# Patient Record
Sex: MALE | Race: WHITE
[De-identification: names, ages, dates, MRNs, and addresses within clinical notes are randomized per-mention and may not be internally consistent; named-entity substitution may affect disease eponyms.]

---

## 2018-02-07 ENCOUNTER — HOSPITAL ENCOUNTER (EMERGENCY)
Dept: HOSPITAL 17 - NEPK | Age: 38
Discharge: HOME | End: 2018-02-07
Payer: SELF-PAY

## 2018-02-07 VITALS
DIASTOLIC BLOOD PRESSURE: 81 MMHG | SYSTOLIC BLOOD PRESSURE: 131 MMHG | RESPIRATION RATE: 17 BRPM | OXYGEN SATURATION: 95 % | HEART RATE: 72 BPM

## 2018-02-07 VITALS
SYSTOLIC BLOOD PRESSURE: 150 MMHG | OXYGEN SATURATION: 95 % | DIASTOLIC BLOOD PRESSURE: 90 MMHG | TEMPERATURE: 100 F | HEART RATE: 95 BPM | RESPIRATION RATE: 20 BRPM

## 2018-02-07 VITALS — WEIGHT: 176.37 LBS | HEIGHT: 71 IN | BODY MASS INDEX: 24.69 KG/M2

## 2018-02-07 VITALS
OXYGEN SATURATION: 95 % | SYSTOLIC BLOOD PRESSURE: 126 MMHG | DIASTOLIC BLOOD PRESSURE: 74 MMHG | RESPIRATION RATE: 18 BRPM | HEART RATE: 78 BPM

## 2018-02-07 DIAGNOSIS — R51: ICD-10-CM

## 2018-02-07 DIAGNOSIS — J10.1: Primary | ICD-10-CM

## 2018-02-07 DIAGNOSIS — F17.210: ICD-10-CM

## 2018-02-07 LAB
BASOPHILS # BLD AUTO: 0 TH/MM3 (ref 0–0.2)
BASOPHILS NFR BLD: 0.8 % (ref 0–2)
BUN SERPL-MCNC: 11 MG/DL (ref 7–18)
CALCIUM SERPL-MCNC: 8 MG/DL (ref 8.5–10.1)
CHLORIDE SERPL-SCNC: 104 MEQ/L (ref 98–107)
CREAT SERPL-MCNC: 0.81 MG/DL (ref 0.6–1.3)
EOSINOPHIL # BLD: 0 TH/MM3 (ref 0–0.4)
EOSINOPHIL NFR BLD: 0.2 % (ref 0–4)
ERYTHROCYTE [DISTWIDTH] IN BLOOD BY AUTOMATED COUNT: 13.2 % (ref 11.6–17.2)
GFR SERPLBLD BASED ON 1.73 SQ M-ARVRAT: 107 ML/MIN (ref 89–?)
GLUCOSE SERPL-MCNC: 95 MG/DL (ref 74–106)
HCO3 BLD-SCNC: 27.2 MEQ/L (ref 21–32)
HCT VFR BLD CALC: 41.3 % (ref 39–51)
HGB BLD-MCNC: 14.4 GM/DL (ref 13–17)
INR PPP: 1.1 RATIO
LYMPHOCYTES # BLD AUTO: 1 TH/MM3 (ref 1–4.8)
LYMPHOCYTES NFR BLD AUTO: 17.9 % (ref 9–44)
MCH RBC QN AUTO: 31.9 PG (ref 27–34)
MCHC RBC AUTO-ENTMCNC: 35 % (ref 32–36)
MCV RBC AUTO: 91.1 FL (ref 80–100)
MONOCYTE #: 0.7 TH/MM3 (ref 0–0.9)
MONOCYTES NFR BLD: 12.9 % (ref 0–8)
NEUTROPHILS # BLD AUTO: 3.8 TH/MM3 (ref 1.8–7.7)
NEUTROPHILS NFR BLD AUTO: 68.2 % (ref 16–70)
PLATELET # BLD: 243 TH/MM3 (ref 150–450)
PMV BLD AUTO: 8.7 FL (ref 7–11)
PROTHROMBIN TIME: 10.7 SEC (ref 9.8–11.6)
RBC # BLD AUTO: 4.53 MIL/MM3 (ref 4.5–5.9)
SODIUM SERPL-SCNC: 138 MEQ/L (ref 136–145)
WBC # BLD AUTO: 5.5 TH/MM3 (ref 4–11)

## 2018-02-07 PROCEDURE — 85730 THROMBOPLASTIN TIME PARTIAL: CPT

## 2018-02-07 PROCEDURE — 70450 CT HEAD/BRAIN W/O DYE: CPT

## 2018-02-07 PROCEDURE — A9579 GAD-BASE MR CONTRAST NOS,1ML: HCPCS

## 2018-02-07 PROCEDURE — 85610 PROTHROMBIN TIME: CPT

## 2018-02-07 PROCEDURE — 99285 EMERGENCY DEPT VISIT HI MDM: CPT

## 2018-02-07 PROCEDURE — 71046 X-RAY EXAM CHEST 2 VIEWS: CPT

## 2018-02-07 PROCEDURE — 70553 MRI BRAIN STEM W/O & W/DYE: CPT

## 2018-02-07 PROCEDURE — 85025 COMPLETE CBC W/AUTO DIFF WBC: CPT

## 2018-02-07 PROCEDURE — 87804 INFLUENZA ASSAY W/OPTIC: CPT

## 2018-02-07 PROCEDURE — 80048 BASIC METABOLIC PNL TOTAL CA: CPT

## 2018-02-07 NOTE — RADRPT
EXAM DATE/TIME:  02/07/2018 12:09 

 

[facility] COMPARISON:     

No previous studies available for comparison.

 

 

INDICATIONS :     

Cephalgia, left jaw pain, fever.

                      

 

RADIATION DOSE:     

56.35 CTDIvol (mGy) 

 

 

 

MEDICAL HISTORY :     

None  

 

SURGICAL HISTORY :      

Tonsillectomy. 

 

ENCOUNTER:      

Initial

 

ACUITY:      

2 days

 

PAIN SCALE:      

7/10

 

LOCATION:        

cranial 

 

TECHNIQUE:     

Multiple contiguous axial images were obtained of the head.  Using automated exposure control and adj
ustment of the mA and/or kV according to patient size, radiation dose was kept as low as reasonably a
chievable to obtain optimal diagnostic quality images.   DICOM format image data is available electro
nically for review and comparison.  

 

FINDINGS:     

There is a high density mass-like lesion within the left frontal lobe measuring 1.2 cm in size raisin
g the possibility of acute parenchymal contusion or possible hemorrhagic mass. MRI of the brain with 
and without contrast would be helpful for further characterization of this finding. No midline shift 
is noted. No acute infarct is noted. The ventricles are normal in size shape and position for the pat
ient's age.

 

CONCLUSION:     High density mass-like lesion within left frontal lobe measuring 1.2 cm in size raisi
ng possibility of acute parenchymal contusion versus hemorrhagic mass. MRI of the brain with and with
out contrast would be helpful for further characterization of this finding.     

 

 

 Natan Iyer MD on February 07, 2018 at 12:18           

Board Certified Radiologist.

 This report was verified electronically.

## 2018-02-07 NOTE — PD
Data


Data


Last Documented VS





Vital Signs








  Date Time  Temp Pulse Resp B/P (MAP) Pulse Ox O2 Delivery O2 Flow Rate FiO2


 


2/7/18 16:51        


 


2/7/18 16:31  72 17  95 Room Air  


 


2/7/18 10:56 100.0       








Orders





 Orders


Influenzae A/B Antigen (2/7/18 11:02)


Ct Brain W/O Iv Contrast(Rout) (2/7/18 )


Acetamin-Hydrocod 325-5 Mg (Norco  5-325 (2/7/18 11:15)


Ibuprofen (Motrin) (2/7/18 11:15)


Chest, Pa & Lat (2/7/18 )


Complete Blood Count With Diff (2/7/18 12:25)


Basic Metabolic Panel (Bmp) (2/7/18 12:25)


Prothrombin Time / Inr (Pt) (2/7/18 12:25)


Act Partial Throm Time (Ptt) (2/7/18 12:25)


Iv Access Insert/Monitor (2/7/18 12:25)


Mri Brain W&W/O Contrast (2/7/18 )


Gadodiamide Pf Inj (Omniscan Pf Inj) (2/7/18 10:56)


Ed Discharge Order (2/7/18 16:31)





Labs





Laboratory Tests








Test


  2/7/18


12:34


 


White Blood Count 5.5 TH/MM3 


 


Red Blood Count 4.53 MIL/MM3 


 


Hemoglobin 14.4 GM/DL 


 


Hematocrit 41.3 % 


 


Mean Corpuscular Volume 91.1 FL 


 


Mean Corpuscular Hemoglobin 31.9 PG 


 


Mean Corpuscular Hemoglobin


Concent 35.0 % 


 


 


Red Cell Distribution Width 13.2 % 


 


Platelet Count 243 TH/MM3 


 


Mean Platelet Volume 8.7 FL 


 


Neutrophils (%) (Auto) 68.2 % 


 


Lymphocytes (%) (Auto) 17.9 % 


 


Monocytes (%) (Auto) 12.9 % 


 


Eosinophils (%) (Auto) 0.2 % 


 


Basophils (%) (Auto) 0.8 % 


 


Neutrophils # (Auto) 3.8 TH/MM3 


 


Lymphocytes # (Auto) 1.0 TH/MM3 


 


Monocytes # (Auto) 0.7 TH/MM3 


 


Eosinophils # (Auto) 0.0 TH/MM3 


 


Basophils # (Auto) 0.0 TH/MM3 


 


CBC Comment DIFF FINAL 


 


Differential Comment  


 


Prothrombin Time 10.7 SEC 


 


Prothromb Time International


Ratio 1.1 RATIO 


 


 


Activated Partial


Thromboplast Time 26.7 SEC 


 


 


Blood Urea Nitrogen 11 MG/DL 


 


Creatinine 0.81 MG/DL 


 


Random Glucose 95 MG/DL 


 


Calcium Level 8.0 MG/DL 


 


Sodium Level 138 MEQ/L 


 


Potassium Level 3.3 MEQ/L 


 


Chloride Level 104 MEQ/L 


 


Carbon Dioxide Level 27.2 MEQ/L 


 


Anion Gap 7 MEQ/L 


 


Estimat Glomerular Filtration


Rate 107 ML/MIN 


 











MDM


Supervised Visit with [facility]:  Yes


Narrative Course


Patient CARE assume from Juanito Powell PA-C at 1315.  This is a patient who 

presents to emergency department for evaluation of flulike illness, he did 

endorse headache and a CT scan was ordered by Mr. Powell, it revealed either a 

mass or some blood in the left parietal anterior lobe.  Patient was recommended 

for an MRI by radiology and he is roomed in a medical pot awaiting his MRI, he 

appears well states his headache is only mild and tells me that he has a 

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 

years and he was told it was benign.  MRI results confirm similar findings to 

what he describes:


Last 24 hours Impressions








Head CT 2/7/18 0000 Signed





Impressions: 





 Service Date/Time:  Wednesday, February 7, 2018 12:09 - CONCLUSION: High 

density 





 mass-like lesion within left frontal lobe measuring 1.2 cm in size raising 





 possibility of acute parenchymal contusion versus hemorrhagic mass. MRI of the 





 brain with and without contrast would be helpful for further characterization 

of 





 this finding.         Natan Iyer MD 


 


Chest X-Ray 2/7/18 0000 Signed





Impressions: 





 Service Date/Time:  Wednesday, February 7, 2018 11:37 - CONCLUSION:  





 Non-consolidative left lower lobe infiltrates.     Rudy Leger MD 


 


Brain MRI 2/7/18 0000 Signed





Impressions: 





 Service Date/Time:  Wednesday, February 7, 2018 14:55 - CONCLUSION:  1. 





 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 

the 





 CT scan findings. No acute intracranial process is identified.     Tim Morrison MD 





I discussed with him these findings and he is reassured but also suggested he 

needs to follow-up with his primary care physician, small possible pneumonia 

the patient was placed on azithromycin as well as Tamiflu.  He is stable for 

discharge


Diagnosis





 Primary Impression:  


 Influenza A


 Additional Impression:  


 Headache


***Med/Other Pt SpecificInfo:  Prescription(s) given


Scripts


Azithromycin (Azithromycin) 250 Mg Tab


250 MG PO AS DIRECTED for Infection, #6 TAB 0 Refills


   Take 2 tabs (500 mg) on day 1 then 1 tab daily x 4 days.


   Prov: Natan Ng MD         2/7/18 


Oseltamivir (Tamiflu) 75 Mg Cap


75 MG PO BID for Mgmt Viral Infection for 5 Days, #10 CAP 0 Refills


   Prov: Natan Ng MD         2/7/18


Disposition:  01 DISCHARGE HOME


Condition:  Stable











Natan Ng MD Feb 7, 2018 13:31

## 2018-02-07 NOTE — RADRPT
EXAM DATE/TIME:  02/07/2018 11:37 

 

[facility] COMPARISON:     

No previous studies available for comparison.

 

                     

INDICATIONS :     

Cough. Fever.

                     

 

MEDICAL HISTORY :     

None.          

 

SURGICAL HISTORY :     

None.   

 

ENCOUNTER:     

Initial                                        

 

ACUITY:     

2 days      

 

PAIN SCORE:     

0/10

 

LOCATION:     

Bilateral chest 

 

FINDINGS:     

Frontal and lateral views of the chest demonstrate asymmetric opacities in the posterior left lower l
mary kay without consolidation. Both hemidiaphragms well delineated. The right lung is clear. The heart is
 normal size.

 

CONCLUSION:     

Non-consolidative left lower lobe infiltrates.

 

 

 

 Rudy Leger MD on February 07, 2018 at 12:13           

Board Certified Radiologist.

 This report was verified electronically.

## 2018-02-07 NOTE — PD
HPI


Chief Complaint:  Cold / Flu Symptoms


Time Seen by Provider:  11:03


Travel History


International Travel<30 days:  No


Contact w/Intl Traveler<30days:  No


Traveled to known affect area:  No





History of Present Illness


HPI


37-year-old male that presents to the ED for evaluation of cold-like symptoms.  

Per patient she's had high fever as high as 103 and Monday headache, congestion 

and cough.  Some chest discomfort as well as.  Per patient he has a wife and 

one of his sons has been having same symptoms except that his symptoms appear 

to be more severe.  He states that the main concern is a headache that he has.  

Per patient he has pain in the left side of his face as well as of the head.  

Per patient yesterday he had an episode were for most of the day he had 

difficulty with vision.  Per patient he could only see yellow.  He denies any 

other medical issues.  Per patient she's been taking Tylenol and Motrin with 

some relief.  Per patient the edition issue has been going on now.  He states 

having body aches and chills.  Patient has cough but no a lot of congestion.  

No ear pain.  Patient is having left upper dental pain isn't sure if this is 

related to the cold like symptoms for this.  Patient denies any recent travel.  

Did not get the flu shot this year.  Has no allergies to medication.  Has been 

taking OTC meds with minimal relief.  Patient's pain per patient is 8 out of 10 

especially on the head.  He tells me that he was told at some point that he has 

some "artery issues "on his head.  Unclear as to what he means is his cannot 

really tell me what it was.  Per patient he had an x-ray that showed this.  He 

has not seen anybody for this.  States feeling loss of appetite and nauseous 

but no vomiting.





PFSH


Past Medical History


Diminished Hearing:  No





Past Surgical History


Tonsillectomy:  Yes





Social History


Alcohol Use:  No


Tobacco Use:  Yes (1 PPD)


Substance Use:  No





Allergies-Medications


(Allergen,Severity, Reaction):  


Coded Allergies:  


     No Known Allergies (Verified  Adverse Reaction, Unknown, 2/7/18)


Reported Meds & Prescriptions





Reported Meds & Active Scripts


Active


Azithromycin 250 Mg Tab 250 Mg PO AS DIRECTED


     Take 2 tabs (500 mg) on day 1 then 1 tab daily x 4 days.


Tamiflu (Oseltamivir Phosphate) 75 Mg Cap 75 Mg PO BID 5 Days


Reported


Tylenol (Acetaminophen) 325 Mg Tab 325 Mg PO ONCE








Review of Systems


Except as stated in HPI:  all other systems reviewed are Neg





Physical Exam


Narrative


GENERAL: Well-nourished, well-developed patient in no apparent distress.


SKIN: Warm and dry.


HEAD: Atraumatic. Normocephalic. 


EYES: Pupils equal and round reactive to light and accommodation.  No scleral 

icterus. No injection or drainage.  


ENT: No nasal bleeding or discharge.  Mucous membranes pink and moist.  TMs are 

clear with no sign of infection or perforation.  No mastoid tenderness.  Ear 

canals are intact bilaterally.  No lymphadenopathy.  Nostril mucosa is red and 

moist with clear mucus noted.  No sinus tenderness to palpation noted.  Tonsils 

are not enlarged or swollen. No ulvua Deviation.  Tongue is midline.


NECK: Trachea midline. No JVD.  No meningeal signs noted


CARDIOVASCULAR: Regular rate and rhythm.  


RESPIRATORY: No accessory muscle use. Clear to auscultation. Breath sounds 

equal bilaterally. 


GASTROINTESTINAL: Abdomen soft, non-tender, nondistended. Hepatic and splenic 

margins not palpable. 


MUSCULOSKELETAL: Extremities without clubbing, cyanosis, or edema. No obvious 

deformities. 


NEUROLOGICAL: Awake and alert. No obvious cranial nerve deficits.  Motor 

grossly within normal limits. Five out of 5 muscle strength in the arms and 

legs.  Normal speech.


PSYCHIATRIC: Appropriate mood and affect; insight and judgment normal.





Data


Data


Last Documented VS





Vital Signs








  Date Time  Temp Pulse Resp B/P (MAP) Pulse Ox O2 Delivery O2 Flow Rate FiO2


 


2/7/18 16:51        


 


2/7/18 16:31  72 17  95 Room Air  


 


2/7/18 10:56 100.0       








Orders





 Orders


Influenzae A/B Antigen (2/7/18 11:02)


Ct Brain W/O Iv Contrast(Rout) (2/7/18 )


Acetamin-Hydrocod 325-5 Mg (Norco  5-325 (2/7/18 11:15)


Ibuprofen (Motrin) (2/7/18 11:15)


Chest, Pa & Lat (2/7/18 )


Complete Blood Count With Diff (2/7/18 12:25)


Basic Metabolic Panel (Bmp) (2/7/18 12:25)


Prothrombin Time / Inr (Pt) (2/7/18 12:25)


Act Partial Throm Time (Ptt) (2/7/18 12:25)


Iv Access Insert/Monitor (2/7/18 12:25)


Mri Brain W&W/O Contrast (2/7/18 )


Gadodiamide Pf Inj (Omniscan Pf Inj) (2/7/18 10:56)


Ed Discharge Order (2/7/18 16:31)





Labs





Laboratory Tests








Test


  2/7/18


12:34


 


White Blood Count 5.5 TH/MM3 


 


Red Blood Count 4.53 MIL/MM3 


 


Hemoglobin 14.4 GM/DL 


 


Hematocrit 41.3 % 


 


Mean Corpuscular Volume 91.1 FL 


 


Mean Corpuscular Hemoglobin 31.9 PG 


 


Mean Corpuscular Hemoglobin


Concent 35.0 % 


 


 


Red Cell Distribution Width 13.2 % 


 


Platelet Count 243 TH/MM3 


 


Mean Platelet Volume 8.7 FL 


 


Neutrophils (%) (Auto) 68.2 % 


 


Lymphocytes (%) (Auto) 17.9 % 


 


Monocytes (%) (Auto) 12.9 % 


 


Eosinophils (%) (Auto) 0.2 % 


 


Basophils (%) (Auto) 0.8 % 


 


Neutrophils # (Auto) 3.8 TH/MM3 


 


Lymphocytes # (Auto) 1.0 TH/MM3 


 


Monocytes # (Auto) 0.7 TH/MM3 


 


Eosinophils # (Auto) 0.0 TH/MM3 


 


Basophils # (Auto) 0.0 TH/MM3 


 


CBC Comment DIFF FINAL 


 


Differential Comment  


 


Prothrombin Time 10.7 SEC 


 


Prothromb Time International


Ratio 1.1 RATIO 


 


 


Activated Partial


Thromboplast Time 26.7 SEC 


 


 


Blood Urea Nitrogen 11 MG/DL 


 


Creatinine 0.81 MG/DL 


 


Random Glucose 95 MG/DL 


 


Calcium Level 8.0 MG/DL 


 


Sodium Level 138 MEQ/L 


 


Potassium Level 3.3 MEQ/L 


 


Chloride Level 104 MEQ/L 


 


Carbon Dioxide Level 27.2 MEQ/L 


 


Anion Gap 7 MEQ/L 


 


Estimat Glomerular Filtration


Rate 107 ML/MIN 


 











[facility]


Medical Decision Making


Medical Screen Exam Complete:  Yes


Emergency Medical Condition:  Yes


Medical Record Reviewed:  Yes


Differential Diagnosis


Influenza versus headache versus migraine headache versus sinusitis versus 

pneumonia versus viral illness


Narrative Course


37-year-old male that presents to the ED for evaluation of cold like symptoms 

and headache.  Patient was properly examined and was found to have signs and 

symptoms which are very consistent with influenza.  Patient does appear to be 

very symptomatic.  Unclear as to the vision issue he had yesterday but this 

appears to have resolved.  Likely secondary to the viral infection as well as 

high fever.  I do not see any sign of meningitis on exam.  Vitals are stable 

here.  I do recommend labs and imaging to rule out any sign of acute disease.  

Patient agrees.  Patient was given Lortab and ibuprofen for discomfort and 

fever.  CT and labs showed normal to the head.  Case was discussed in my 

attending Dr. Ng who was made aware of findings and recommends the patient 

remove for MRI.  MRI was ordered as well as lab work.  Patient was moved to the 

care of Dr. Ng please refer to his note.


Scripts


Azithromycin (Azithromycin) 250 Mg Tab


250 MG PO AS DIRECTED for Infection, #6 TAB 0 Refills


   Take 2 tabs (500 mg) on day 1 then 1 tab daily x 4 days.


   Prov: Natan Ng MD         2/7/18 


Oseltamivir (Tamiflu) 75 Mg Cap


75 MG PO BID for Mgmt Viral Infection for 5 Days, #10 CAP 0 Refills


   Prov: Natan Ng MD         2/7/18











Juanito Powell Feb 7, 2018 11:31

## 2018-02-07 NOTE — RADRPT
EXAM DATE/TIME:  02/07/2018 14:55 

 

[facility] COMPARISON:     

CT BRAIN W/O CONTRAST, February 07, 2018, 12:09.

       

 

 

INDICATIONS :     

Mass.

                     

 

CONTRAST:     

20 cc Omniscan (gadodiamide) IV

                     

 

MEDICAL HISTORY :     

None.     

 

SURGICAL HISTORY :           

Knee surgery

 

ENCOUNTER:     

Initial

 

ACUITY:     

2 day

 

PAIN SCORE:     

2/10

 

LOCATION:         

head

 

TECHNIQUE:     

Multiplanar, multisequence MRI of the brain was performed both prior to and following the administrat
ion of paramagnetic contrast.

 

FINDINGS:     

MRI of the brain is performed in sagittal, axial and coronal planes. The craniocervical junction and 
midline structures are unremarkable. Diffusion weighted images demonstrate no abnormality. There is n
o evidence of acute cortical infarction, acute hemorrhage, mass effect or midline shift is seen. Ther
e is benign-appearing mucosal disease in the left maxillary sinus. Following the administration of co
ntrast no abnormal enhancement is identified. There is a cavernous angioma in the left orbital fronta
l region accounting for the CT findings. Posterior fossa structures are unremarkable.

 

CONCLUSION:     

1. 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 
No acute intracranial process is identified.

 

 

 

 Tim Morrison MD on February 07, 2018 at 16:15           

Board Certified Radiologist.

 This report was verified electronically.